# Patient Record
Sex: MALE | Race: WHITE | NOT HISPANIC OR LATINO
[De-identification: names, ages, dates, MRNs, and addresses within clinical notes are randomized per-mention and may not be internally consistent; named-entity substitution may affect disease eponyms.]

---

## 2017-03-16 ENCOUNTER — TRANSCRIPTION ENCOUNTER (OUTPATIENT)
Age: 27
End: 2017-03-16

## 2017-03-17 ENCOUNTER — EMERGENCY (EMERGENCY)
Facility: HOSPITAL | Age: 27
LOS: 1 days | Discharge: HOME | End: 2017-03-17
Attending: EMERGENCY MEDICINE | Admitting: EMERGENCY MEDICINE
Payer: COMMERCIAL

## 2017-03-17 VITALS
HEIGHT: 71 IN | TEMPERATURE: 97 F | DIASTOLIC BLOOD PRESSURE: 62 MMHG | SYSTOLIC BLOOD PRESSURE: 127 MMHG | HEART RATE: 68 BPM | OXYGEN SATURATION: 100 % | RESPIRATION RATE: 18 BRPM | WEIGHT: 164.91 LBS

## 2017-03-17 VITALS
HEART RATE: 62 BPM | SYSTOLIC BLOOD PRESSURE: 118 MMHG | OXYGEN SATURATION: 100 % | DIASTOLIC BLOOD PRESSURE: 74 MMHG | RESPIRATION RATE: 16 BRPM | TEMPERATURE: 98 F

## 2017-03-17 DIAGNOSIS — B95.62 METHICILLIN RESISTANT STAPHYLOCOCCUS AUREUS INFECTION AS THE CAUSE OF DISEASES CLASSIFIED ELSEWHERE: ICD-10-CM

## 2017-03-17 DIAGNOSIS — Z88.2 ALLERGY STATUS TO SULFONAMIDES: ICD-10-CM

## 2017-03-17 DIAGNOSIS — T36.8X5A ADVERSE EFFECT OF OTHER SYSTEMIC ANTIBIOTICS, INITIAL ENCOUNTER: ICD-10-CM

## 2017-03-17 DIAGNOSIS — Z88.1 ALLERGY STATUS TO OTHER ANTIBIOTIC AGENTS STATUS: ICD-10-CM

## 2017-03-17 DIAGNOSIS — Z88.0 ALLERGY STATUS TO PENICILLIN: ICD-10-CM

## 2017-03-17 DIAGNOSIS — R21 RASH AND OTHER NONSPECIFIC SKIN ERUPTION: ICD-10-CM

## 2017-03-17 PROCEDURE — 99284 EMERGENCY DEPT VISIT MOD MDM: CPT | Mod: 25

## 2017-03-17 PROCEDURE — 85027 COMPLETE CBC AUTOMATED: CPT

## 2017-03-17 PROCEDURE — 80048 BASIC METABOLIC PNL TOTAL CA: CPT

## 2017-03-17 PROCEDURE — 96375 TX/PRO/DX INJ NEW DRUG ADDON: CPT

## 2017-03-17 PROCEDURE — 96374 THER/PROPH/DIAG INJ IV PUSH: CPT

## 2017-03-17 PROCEDURE — 99284 EMERGENCY DEPT VISIT MOD MDM: CPT

## 2017-03-17 PROCEDURE — 80076 HEPATIC FUNCTION PANEL: CPT

## 2017-03-17 RX ORDER — FAMOTIDINE 10 MG/ML
20 INJECTION INTRAVENOUS ONCE
Qty: 0 | Refills: 0 | Status: COMPLETED | OUTPATIENT
Start: 2017-03-17 | End: 2017-03-17

## 2017-03-17 RX ORDER — DIPHENHYDRAMINE HCL 50 MG
50 CAPSULE ORAL ONCE
Qty: 0 | Refills: 0 | Status: COMPLETED | OUTPATIENT
Start: 2017-03-17 | End: 2017-03-17

## 2017-03-17 RX ORDER — CIPROFLOXACIN LACTATE 400MG/40ML
0 VIAL (ML) INTRAVENOUS
Qty: 0 | Refills: 0 | COMMUNITY

## 2017-03-17 RX ADMIN — Medication 50 MILLIGRAM(S): at 19:32

## 2017-03-17 RX ADMIN — Medication 100 MILLIGRAM(S): at 19:41

## 2017-03-17 RX ADMIN — FAMOTIDINE 20 MILLIGRAM(S): 10 INJECTION INTRAVENOUS at 19:33

## 2017-03-17 RX ADMIN — Medication 125 MILLIGRAM(S): at 19:41

## 2017-03-17 NOTE — ED PROVIDER NOTE - NS ED MD SCRIBE ATTENDING SCRIBE SECTIONS
REVIEW OF SYSTEMS/HISTORY OF PRESENT ILLNESS/DISPOSITION/HIV/PAST MEDICAL/SURGICAL/SOCIAL HISTORY/PHYSICAL EXAM/VITAL SIGNS( Pullset)

## 2017-03-17 NOTE — ED PROVIDER NOTE - MEDICAL DECISION MAKING DETAILS
Patient with diffuse urticarial rash secondary to bactrim after getting turf burn on right knee. Was seen at [facility] last week, where culture showed MRSA. Was seen yesterday back at [facility], and was switched to Cipro, but Cipro does not cover MRSA. Patient has only been taking benadryl sporadically. He has not taken steroids. Will consult with ID and give IV benadryl and Pepcid.

## 2017-03-17 NOTE — ED ADULT NURSE NOTE - OBJECTIVE STATEMENT
Patient  is  alert  and  oriented  x3.  Color  is  good  and  skin warm to touch.  Hives  noted to  his   back,  ears  and  arms.  He  is  c/o itchiness.  Patient  has  a  wound  to  his  rt lower  leg  which  is  positive   for  MRSA.

## 2017-03-17 NOTE — ED PROVIDER NOTE - CARE PLAN
Principal Discharge DX:	Allergic reaction to drug, initial encounter  Secondary Diagnosis:	MRSA infection

## 2017-03-17 NOTE — ED ADULT NURSE REASSESSMENT NOTE - NS ED NURSE REASSESS COMMENT FT1
patient feels better, rashes subsided, due antibiotics given, VS wnl. Discharged home in stable condition, advised to  meds to his pharmacy and continue taking his antibiotics and ff-up with his PMD in 2-3 days.

## 2017-03-17 NOTE — ED ADULT NURSE NOTE - DISCHARGE TEACHING
ff-up with PMD in 2-3 days and continue taking antibiotics as directed. Return to ED for worsening symptoms.

## 2017-03-17 NOTE — ED PROVIDER NOTE - DETAILS:
Attending MD Silva: The documentation recorded by the scribe accurately reflects  the service I personally performed and the decisions made by me.

## 2017-03-17 NOTE — ED PROVIDER NOTE - OBJECTIVE STATEMENT
26 year old male patient presents to the ED with a diffuse pruritic and urticarial rash on his chest, back, extremities, and face after taking bactrim. Last dose of bactrim was yesterday morning. Last week, patient got turf burn on his right knee. He went to [facility], had it cultured, and was placed on bactrim after the culture showed MRSA. He started getting hives yesterday and had his medications changed to cipro at [facility], but cipro does not cover mrsa. Patient was told to take benadryl, but he took it sporadically and his rash continued to worsen. The last dose of benadryl was this morning. Has not taken steroids. Patient is also allergic to penicillin and clindamycin, which cause him to get a rash.  PMD: Dr. Kaleb Venegas

## 2017-03-17 NOTE — ED PROVIDER NOTE - PROGRESS NOTE DETAILS
Attending Reda: Consulted infectious disease, who recommended switch to doxycycline. Patient told to follow up with ID clinic. Monitored for allergic reaction. Rash is improving.

## 2017-03-29 ENCOUNTER — APPOINTMENT (OUTPATIENT)
Dept: INFECTIOUS DISEASE | Facility: CLINIC | Age: 27
End: 2017-03-29

## 2017-03-29 VITALS
TEMPERATURE: 97.5 F | WEIGHT: 163 LBS | HEART RATE: 67 BPM | OXYGEN SATURATION: 99 % | HEIGHT: 71 IN | SYSTOLIC BLOOD PRESSURE: 123 MMHG | DIASTOLIC BLOOD PRESSURE: 79 MMHG | BODY MASS INDEX: 22.82 KG/M2

## 2017-03-29 DIAGNOSIS — Z78.9 OTHER SPECIFIED HEALTH STATUS: ICD-10-CM

## 2017-06-26 ENCOUNTER — APPOINTMENT (OUTPATIENT)
Dept: INTERNAL MEDICINE | Facility: CLINIC | Age: 27
End: 2017-06-26

## 2017-09-28 ENCOUNTER — APPOINTMENT (OUTPATIENT)
Dept: INTERNAL MEDICINE | Facility: CLINIC | Age: 27
End: 2017-09-28
Payer: COMMERCIAL

## 2017-09-28 ENCOUNTER — LABORATORY RESULT (OUTPATIENT)
Age: 27
End: 2017-09-28

## 2017-09-28 VITALS
SYSTOLIC BLOOD PRESSURE: 120 MMHG | BODY MASS INDEX: 23.38 KG/M2 | DIASTOLIC BLOOD PRESSURE: 70 MMHG | HEART RATE: 63 BPM | HEIGHT: 71 IN | TEMPERATURE: 98.6 F | WEIGHT: 167 LBS | OXYGEN SATURATION: 97 %

## 2017-09-28 PROCEDURE — 99213 OFFICE O/P EST LOW 20 MIN: CPT

## 2017-12-12 ENCOUNTER — RX RENEWAL (OUTPATIENT)
Age: 27
End: 2017-12-12

## 2017-12-12 ENCOUNTER — APPOINTMENT (OUTPATIENT)
Dept: INTERNAL MEDICINE | Facility: CLINIC | Age: 27
End: 2017-12-12
Payer: COMMERCIAL

## 2017-12-12 VITALS — DIASTOLIC BLOOD PRESSURE: 68 MMHG | SYSTOLIC BLOOD PRESSURE: 118 MMHG

## 2017-12-12 VITALS
HEIGHT: 71 IN | WEIGHT: 162 LBS | BODY MASS INDEX: 22.68 KG/M2 | HEART RATE: 59 BPM | SYSTOLIC BLOOD PRESSURE: 110 MMHG | OXYGEN SATURATION: 98 % | DIASTOLIC BLOOD PRESSURE: 80 MMHG | TEMPERATURE: 98.2 F

## 2017-12-12 DIAGNOSIS — L03.115 CELLULITIS OF RIGHT LOWER LIMB: ICD-10-CM

## 2017-12-12 DIAGNOSIS — E80.4 GILBERT SYNDROME: ICD-10-CM

## 2017-12-12 DIAGNOSIS — Z87.39 PERSONAL HISTORY OF OTHER DISEASES OF THE MUSCULOSKELETAL SYSTEM AND CONNECTIVE TISSUE: ICD-10-CM

## 2017-12-12 PROCEDURE — 99395 PREV VISIT EST AGE 18-39: CPT | Mod: 25

## 2017-12-12 PROCEDURE — 36415 COLL VENOUS BLD VENIPUNCTURE: CPT

## 2017-12-12 RX ORDER — MUPIROCIN 20 MG/G
2 OINTMENT TOPICAL TWICE DAILY
Qty: 1 | Refills: 0 | Status: DISCONTINUED | COMMUNITY
Start: 2017-09-28 | End: 2017-12-12

## 2017-12-12 RX ORDER — FEXOFENADINE HYDROCHLORIDE 180 MG/1
180 TABLET, FILM COATED ORAL
Refills: 0 | Status: ACTIVE | COMMUNITY

## 2017-12-12 RX ORDER — LINEZOLID 600 MG/1
600 TABLET, FILM COATED ORAL
Qty: 20 | Refills: 0 | Status: DISCONTINUED | COMMUNITY
Start: 2017-09-28 | End: 2017-12-12

## 2018-01-09 ENCOUNTER — RX RENEWAL (OUTPATIENT)
Age: 28
End: 2018-01-09

## 2018-02-05 ENCOUNTER — EMERGENCY (EMERGENCY)
Facility: HOSPITAL | Age: 28
LOS: 1 days | Discharge: HOME | End: 2018-02-05
Attending: EMERGENCY MEDICINE | Admitting: EMERGENCY MEDICINE
Payer: COMMERCIAL

## 2018-02-05 VITALS
HEART RATE: 66 BPM | SYSTOLIC BLOOD PRESSURE: 124 MMHG | RESPIRATION RATE: 17 BRPM | OXYGEN SATURATION: 100 % | TEMPERATURE: 98 F | DIASTOLIC BLOOD PRESSURE: 83 MMHG

## 2018-02-05 VITALS
TEMPERATURE: 99 F | DIASTOLIC BLOOD PRESSURE: 69 MMHG | OXYGEN SATURATION: 98 % | WEIGHT: 164.91 LBS | RESPIRATION RATE: 18 BRPM | SYSTOLIC BLOOD PRESSURE: 116 MMHG | HEART RATE: 70 BPM

## 2018-02-05 PROCEDURE — 93010 ELECTROCARDIOGRAM REPORT: CPT

## 2018-02-05 PROCEDURE — 99285 EMERGENCY DEPT VISIT HI MDM: CPT | Mod: 25

## 2018-02-05 RX ORDER — SODIUM CHLORIDE 9 MG/ML
3 INJECTION INTRAMUSCULAR; INTRAVENOUS; SUBCUTANEOUS ONCE
Qty: 0 | Refills: 0 | Status: DISCONTINUED | OUTPATIENT
Start: 2018-02-05 | End: 2018-02-09

## 2018-02-05 RX ORDER — FAMOTIDINE 10 MG/ML
20 INJECTION INTRAVENOUS ONCE
Qty: 0 | Refills: 0 | Status: DISCONTINUED | OUTPATIENT
Start: 2018-02-05 | End: 2018-02-06

## 2018-02-05 RX ADMIN — Medication 30 MILLILITER(S): at 23:42

## 2018-02-05 NOTE — ED ADULT NURSE NOTE - OBJECTIVE STATEMENT
Patient presented to ED ambulatory w/ mother c/o mid sternal  chest pain x 3 days. Patient exercises regularly. Patient took aspirin at home. Patient presented to ED ambulatory w/ mother c/o mid sternal  chest pain x 3 days. Patient exercises regularly. Patient took aspirin at home. No further complaints

## 2018-02-05 NOTE — ED PROVIDER NOTE - MEDICAL DECISION MAKING DETAILS
Dr. Coy: Male patient with no past medical hx of systemic illnesses, brought to ED due to complaints of chest pain. Patient found in no acute distress, calm, speaking in complete sentences. ECG with no acute ischemic changes. Patient describes intermittent, sharp, pulling chest pain with no associated diaphoresis, nausea/vomiting. No family hx of CAD. Labs and CXR ordered as part of cardiac work-up. Patient describes associated occasional belching, for which pepcid and maalox were ordered. Will f/u on results and patient's clinical progression to determine final disposition.

## 2018-02-05 NOTE — ED PROVIDER NOTE - PROGRESS NOTE DETAILS
Patient was evaluated by me, found in no acute distress, calm, speaking in complete sentences. Patient describes intermittent, sharp, pulling chest pain with no associated diaphoresis, nausea/vomiting. No family hx of CAD. Labs and CXR ordered as part of cardiac work-up. Patient describes associated occasional belching, for which pepcid and maalox were ordered. Will f/u on results and patient's clinical progression to determine final disposition. I agree with resident assessment and plan. Dr. Skip Coy Called radiology - discussed the CXR findings. no acute/emergent findings. will send home with return precautions Results discussed, will dc with f/u PMD Patient was evaluated by me, found in no acute distress, calm, speaking in complete sentences. Patient describes intermittent, sharp, pulling chest pain with no associated diaphoresis, nausea/vomiting. No family hx of CAD. Labs and CXR ordered as part of cardiac work-up. Patient describes associated occasional belching, for which pepcid and maalox were ordered. Will f/u on results and patient's clinical progression to determine final disposition. I agree with resident assessment and plan.- Dr. Skip Coy

## 2018-02-05 NOTE — ED PROVIDER NOTE - PHYSICAL EXAMINATION
Gen: Well appearing, NAD  Head: NCAT  HEENT: PERRL, MMM, normal conjunctiva, anicteric, neck supple  Lung: CTAB, no adventitious sounds  CV: RRR, no murmurs, rubs or gallops  Abd: soft, NTND, no rebound or guarding, no CVAT  MSK: No edema, no visible deformities  Neuro: No focal neurologic deficits. CN II-XII grossly intact. 5/5 strength and normal sensation in all extremities.  Skin: Warm and dry, no evidence of rash  Psych: anxious mood and affect

## 2018-02-05 NOTE — ED PROVIDER NOTE - CARE PLAN
Principal Discharge DX:	Chest discomfort Principal Discharge DX:	Chest discomfort  Assessment and plan of treatment:	Take ibuprofen 600 mg every 8 hours as needed for pain with food and plenty of water  Rest, ice or heat packs as needed for discomfort.  Follow up with your primary doctor within the next 1-2 days  If you have any worsening or changing symptoms such as confusion, loss of consciousness, worsening pain, nausea/vomiting, or if you have any other concerns please return to the emergency department

## 2018-02-05 NOTE — ED PROVIDER NOTE - NS ED ROS FT
ROS: denies HA, weakness, dizziness, fevers/chills, nausea/vomiting, SOB, diaphoresis, abdominal pain, diarrhea, joint pain, neuro deficits, dysuria/hematuria, rash    +Chest discomfort

## 2018-02-05 NOTE — ED PROVIDER NOTE - OBJECTIVE STATEMENT
28yo M with hx of GERD presents with midsternal chest discomfort for 3-4 days. feels similar to GERD but radiates to the L chest and down the L arm. No FH of heart disease, no hx of smoking. pain worse with exertion, some palpitations when working out. no associated sob/diaphoresis. otherwise healthy. no fevers, chills, no recent illnesses.

## 2018-02-06 LAB
ALBUMIN SERPL ELPH-MCNC: 5 G/DL — SIGNIFICANT CHANGE UP (ref 3.3–5)
ALBUMIN SERPL ELPH-MCNC: 5.2 G/DL
ALP BLD-CCNC: 66 U/L
ALP SERPL-CCNC: 62 U/L — SIGNIFICANT CHANGE UP (ref 40–120)
ALT FLD-CCNC: 17 U/L RC — SIGNIFICANT CHANGE UP (ref 10–45)
ALT SERPL-CCNC: 22 U/L
ANION GAP SERPL CALC-SCNC: 13 MMOL/L — SIGNIFICANT CHANGE UP (ref 5–17)
ANION GAP SERPL CALC-SCNC: 15 MMOL/L
AST SERPL-CCNC: 21 U/L — SIGNIFICANT CHANGE UP (ref 10–40)
AST SERPL-CCNC: 23 U/L
BASOPHILS # BLD AUTO: 0.05 K/UL
BASOPHILS # BLD AUTO: 0.1 K/UL — SIGNIFICANT CHANGE UP (ref 0–0.2)
BASOPHILS NFR BLD AUTO: 1.1 %
BASOPHILS NFR BLD AUTO: 1.1 % — SIGNIFICANT CHANGE UP (ref 0–2)
BILIRUB SERPL-MCNC: 1.5 MG/DL — HIGH (ref 0.2–1.2)
BILIRUB SERPL-MCNC: 2.9 MG/DL
BUN SERPL-MCNC: 16 MG/DL — SIGNIFICANT CHANGE UP (ref 7–23)
BUN SERPL-MCNC: 20 MG/DL
C TRACH RRNA SPEC QL NAA+PROBE: NOT DETECTED
CALCIUM SERPL-MCNC: 10.2 MG/DL
CALCIUM SERPL-MCNC: 9.5 MG/DL — SIGNIFICANT CHANGE UP (ref 8.4–10.5)
CHLORIDE SERPL-SCNC: 100 MMOL/L
CHLORIDE SERPL-SCNC: 100 MMOL/L — SIGNIFICANT CHANGE UP (ref 96–108)
CHOLEST SERPL-MCNC: 165 MG/DL
CHOLEST/HDLC SERPL: 2.7 RATIO
CO2 SERPL-SCNC: 24 MMOL/L
CO2 SERPL-SCNC: 25 MMOL/L — SIGNIFICANT CHANGE UP (ref 22–31)
CREAT SERPL-MCNC: 1.11 MG/DL — SIGNIFICANT CHANGE UP (ref 0.5–1.3)
CREAT SERPL-MCNC: 1.29 MG/DL
EOSINOPHIL # BLD AUTO: 0.1 K/UL — SIGNIFICANT CHANGE UP (ref 0–0.5)
EOSINOPHIL # BLD AUTO: 0.24 K/UL
EOSINOPHIL NFR BLD AUTO: 1.5 % — SIGNIFICANT CHANGE UP (ref 0–6)
EOSINOPHIL NFR BLD AUTO: 5.3
GLUCOSE SERPL-MCNC: 88 MG/DL
GLUCOSE SERPL-MCNC: 89 MG/DL — SIGNIFICANT CHANGE UP (ref 70–99)
HBV SURFACE AB SER QL: NONREACTIVE
HBV SURFACE AG SER QL: NONREACTIVE
HCT VFR BLD CALC: 46.6 % — SIGNIFICANT CHANGE UP (ref 39–50)
HCT VFR BLD CALC: 47.8 %
HCV AB SER QL: NONREACTIVE
HCV S/CO RATIO: 0.19 S/CO
HDLC SERPL-MCNC: 62 MG/DL
HGB BLD-MCNC: 16.4 G/DL
HGB BLD-MCNC: 16.7 G/DL — SIGNIFICANT CHANGE UP (ref 13–17)
HIV1+2 AB SPEC QL IA.RAPID: NONREACTIVE
HSV 1+2 IGG SER IA-IMP: NEGATIVE
HSV 1+2 IGG SER IA-IMP: NEGATIVE
HSV1 IGG SER QL: 0.16 INDEX
HSV2 IGG SER QL: 0.09 INDEX
IMM GRANULOCYTES NFR BLD AUTO: 0 %
LDLC SERPL CALC-MCNC: 86 MG/DL
LYMPHOCYTES # BLD AUTO: 1.45 K/UL
LYMPHOCYTES # BLD AUTO: 1.9 K/UL — SIGNIFICANT CHANGE UP (ref 1–3.3)
LYMPHOCYTES # BLD AUTO: 24.9 % — SIGNIFICANT CHANGE UP (ref 13–44)
LYMPHOCYTES NFR BLD AUTO: 31.9 %
MAN DIFF?: NORMAL
MCHC RBC-ENTMCNC: 33.4 PG
MCHC RBC-ENTMCNC: 34.3 GM/DL
MCHC RBC-ENTMCNC: 34.6 PG — HIGH (ref 27–34)
MCHC RBC-ENTMCNC: 35.9 GM/DL — SIGNIFICANT CHANGE UP (ref 32–36)
MCV RBC AUTO: 96.2 FL — SIGNIFICANT CHANGE UP (ref 80–100)
MCV RBC AUTO: 97.4 FL
MONOCYTES # BLD AUTO: 0.4 K/UL — SIGNIFICANT CHANGE UP (ref 0–0.9)
MONOCYTES # BLD AUTO: 0.43 K/UL
MONOCYTES NFR BLD AUTO: 5.5 % — SIGNIFICANT CHANGE UP (ref 2–14)
MONOCYTES NFR BLD AUTO: 9.5 %
N GONORRHOEA RRNA SPEC QL NAA+PROBE: NOT DETECTED
NEUTROPHILS # BLD AUTO: 2.37 K/UL
NEUTROPHILS # BLD AUTO: 5.2 K/UL — SIGNIFICANT CHANGE UP (ref 1.8–7.4)
NEUTROPHILS NFR BLD AUTO: 52.2 %
NEUTROPHILS NFR BLD AUTO: 67 % — SIGNIFICANT CHANGE UP (ref 43–77)
PLATELET # BLD AUTO: 187 K/UL
PLATELET # BLD AUTO: 190 K/UL — SIGNIFICANT CHANGE UP (ref 150–400)
POTASSIUM SERPL-MCNC: 3.6 MMOL/L — SIGNIFICANT CHANGE UP (ref 3.5–5.3)
POTASSIUM SERPL-SCNC: 3.6 MMOL/L — SIGNIFICANT CHANGE UP (ref 3.5–5.3)
POTASSIUM SERPL-SCNC: 4.2 MMOL/L
PROT SERPL-MCNC: 7.4 G/DL — SIGNIFICANT CHANGE UP (ref 6–8.3)
PROT SERPL-MCNC: 7.9 G/DL
RBC # BLD: 4.84 M/UL — SIGNIFICANT CHANGE UP (ref 4.2–5.8)
RBC # BLD: 4.91 M/UL
RBC # FLD: 10.6 % — SIGNIFICANT CHANGE UP (ref 10.3–14.5)
RBC # FLD: 12 %
SODIUM SERPL-SCNC: 138 MMOL/L — SIGNIFICANT CHANGE UP (ref 135–145)
SODIUM SERPL-SCNC: 139 MMOL/L
SOURCE AMPLIFICATION: NORMAL
T PALLIDUM AB SER QL IA: NEGATIVE
T4 FREE SERPL-MCNC: 1.6 NG/DL
TRIGL SERPL-MCNC: 85 MG/DL
TROPONIN T SERPL-MCNC: <0.01 NG/ML — SIGNIFICANT CHANGE UP (ref 0–0.06)
TSH SERPL-ACNC: 1.45 UIU/ML
WBC # BLD: 7.7 K/UL — SIGNIFICANT CHANGE UP (ref 3.8–10.5)
WBC # FLD AUTO: 4.54 K/UL
WBC # FLD AUTO: 7.7 K/UL — SIGNIFICANT CHANGE UP (ref 3.8–10.5)

## 2018-02-06 PROCEDURE — 99283 EMERGENCY DEPT VISIT LOW MDM: CPT | Mod: 25

## 2018-02-06 PROCEDURE — 93005 ELECTROCARDIOGRAM TRACING: CPT

## 2018-02-06 PROCEDURE — 84484 ASSAY OF TROPONIN QUANT: CPT

## 2018-02-06 PROCEDURE — 85027 COMPLETE CBC AUTOMATED: CPT

## 2018-02-06 PROCEDURE — 80053 COMPREHEN METABOLIC PANEL: CPT

## 2018-02-06 PROCEDURE — 71046 X-RAY EXAM CHEST 2 VIEWS: CPT

## 2018-02-06 PROCEDURE — 71046 X-RAY EXAM CHEST 2 VIEWS: CPT | Mod: 26

## 2018-02-06 RX ORDER — FAMOTIDINE 10 MG/ML
20 INJECTION INTRAVENOUS DAILY
Qty: 0 | Refills: 0 | Status: DISCONTINUED | OUTPATIENT
Start: 2018-02-06 | End: 2018-02-09

## 2018-02-06 RX ADMIN — FAMOTIDINE 20 MILLIGRAM(S): 10 INJECTION INTRAVENOUS at 00:39

## 2018-02-08 ENCOUNTER — NON-APPOINTMENT (OUTPATIENT)
Age: 28
End: 2018-02-08

## 2018-02-08 ENCOUNTER — APPOINTMENT (OUTPATIENT)
Dept: CARDIOLOGY | Facility: CLINIC | Age: 28
End: 2018-02-08
Payer: COMMERCIAL

## 2018-02-08 VITALS
DIASTOLIC BLOOD PRESSURE: 73 MMHG | OXYGEN SATURATION: 99 % | SYSTOLIC BLOOD PRESSURE: 118 MMHG | RESPIRATION RATE: 12 BRPM | WEIGHT: 160 LBS | HEIGHT: 71 IN | BODY MASS INDEX: 22.4 KG/M2 | TEMPERATURE: 97.3 F | HEART RATE: 53 BPM

## 2018-02-08 DIAGNOSIS — R07.89 OTHER CHEST PAIN: ICD-10-CM

## 2018-02-08 PROCEDURE — 93000 ELECTROCARDIOGRAM COMPLETE: CPT

## 2018-02-08 PROCEDURE — 99244 OFF/OP CNSLTJ NEW/EST MOD 40: CPT | Mod: 25

## 2018-02-10 ENCOUNTER — NON-APPOINTMENT (OUTPATIENT)
Age: 28
End: 2018-02-10

## 2018-02-12 ENCOUNTER — APPOINTMENT (OUTPATIENT)
Dept: CARDIOLOGY | Facility: CLINIC | Age: 28
End: 2018-02-12
Payer: COMMERCIAL

## 2018-02-12 PROCEDURE — 93306 TTE W/DOPPLER COMPLETE: CPT

## 2018-04-12 ENCOUNTER — APPOINTMENT (OUTPATIENT)
Dept: CARDIOLOGY | Facility: CLINIC | Age: 28
End: 2018-04-12

## 2020-01-27 ENCOUNTER — APPOINTMENT (OUTPATIENT)
Dept: INTERNAL MEDICINE | Facility: CLINIC | Age: 30
End: 2020-01-27
Payer: COMMERCIAL

## 2020-01-27 VITALS
BODY MASS INDEX: 22.68 KG/M2 | HEIGHT: 71 IN | WEIGHT: 162 LBS | OXYGEN SATURATION: 97 % | HEART RATE: 57 BPM | TEMPERATURE: 97.8 F

## 2020-01-27 PROCEDURE — 36415 COLL VENOUS BLD VENIPUNCTURE: CPT

## 2020-01-27 PROCEDURE — G0444 DEPRESSION SCREEN ANNUAL: CPT

## 2020-01-27 PROCEDURE — 99395 PREV VISIT EST AGE 18-39: CPT | Mod: 25

## 2020-01-27 RX ORDER — CHROMIUM 200 MCG
TABLET ORAL
Refills: 0 | Status: ACTIVE | COMMUNITY

## 2020-01-27 RX ORDER — CHLORHEXIDINE GLUCONATE 4 %
LIQUID (ML) TOPICAL
Refills: 0 | Status: ACTIVE | COMMUNITY

## 2020-01-28 VITALS — DIASTOLIC BLOOD PRESSURE: 62 MMHG | SYSTOLIC BLOOD PRESSURE: 105 MMHG

## 2020-01-28 NOTE — PHYSICAL EXAM
[No Acute Distress] : no acute distress [Well Nourished] : well nourished [Well Developed] : well developed [Well-Appearing] : well-appearing [Normal Sclera/Conjunctiva] : normal sclera/conjunctiva [PERRL] : pupils equal round and reactive to light [EOMI] : extraocular movements intact [Normal Outer Ear/Nose] : the outer ears and nose were normal in appearance [Normal Oropharynx] : the oropharynx was normal [No JVD] : no jugular venous distention [No Lymphadenopathy] : no lymphadenopathy [Supple] : supple [Thyroid Normal, No Nodules] : the thyroid was normal and there were no nodules present [No Respiratory Distress] : no respiratory distress  [No Accessory Muscle Use] : no accessory muscle use [Clear to Auscultation] : lungs were clear to auscultation bilaterally [Normal Rate] : normal rate  [Regular Rhythm] : with a regular rhythm [Normal S1, S2] : normal S1 and S2 [No Murmur] : no murmur heard [No Carotid Bruits] : no carotid bruits [No Abdominal Bruit] : a ~M bruit was not heard ~T in the abdomen [No Varicosities] : no varicosities [Pedal Pulses Present] : the pedal pulses are present [No Edema] : there was no peripheral edema [No Palpable Aorta] : no palpable aorta [No Extremity Clubbing/Cyanosis] : no extremity clubbing/cyanosis [Soft] : abdomen soft [Non Tender] : non-tender [Non-distended] : non-distended [No Masses] : no abdominal mass palpated [No HSM] : no HSM [Normal Bowel Sounds] : normal bowel sounds [Normal Posterior Cervical Nodes] : no posterior cervical lymphadenopathy [Normal Anterior Cervical Nodes] : no anterior cervical lymphadenopathy [No CVA Tenderness] : no CVA  tenderness [No Spinal Tenderness] : no spinal tenderness [No Joint Swelling] : no joint swelling [Grossly Normal Strength/Tone] : grossly normal strength/tone [No Rash] : no rash [Coordination Grossly Intact] : coordination grossly intact [No Focal Deficits] : no focal deficits [Normal Gait] : normal gait [Deep Tendon Reflexes (DTR)] : deep tendon reflexes were 2+ and symmetric [Normal Affect] : the affect was normal [Normal Insight/Judgement] : insight and judgment were intact [de-identified] : normal male, no masses [de-identified] : left abdominal wall mole with slight surrounding erythema

## 2020-01-28 NOTE — HEALTH RISK ASSESSMENT
[No] : No [No falls in past year] : Patient reported no falls in the past year [0] : 2) Feeling down, depressed, or hopeless: Not at all (0) [Fully functional (bathing, dressing, toileting, transferring, walking, feeding)] : Fully functional (bathing, dressing, toileting, transferring, walking, feeding) [Fully functional (using the telephone, shopping, preparing meals, housekeeping, doing laundry, using] : Fully functional and needs no help or supervision to perform IADLs (using the telephone, shopping, preparing meals, housekeeping, doing laundry, using transportation, managing medications and managing finances) [] : No [OOM6Demos] : 0 [Reports changes in hearing] : Reports no changes in hearing [Reports changes in vision] : Reports no changes in vision [Reports changes in dental health] : Reports no changes in dental health

## 2020-01-28 NOTE — ASSESSMENT
[FreeTextEntry1] : Discussed diet and exercise.  Weight is good.  Check lipids.\par \par The left abdominal wall brown lesion has changed with a slight surrounding redness.  It requires evaluation- he already has an appointment to see a dermatologist tomorrow.  He will have the report sent here.\par \par STD testing not needed.  \par \par Check a Vitamin D- he takes 5000 units QD.\par \par He declines the flu vaccine.  \par \par He requests a HgBA1c- FH DM.  \par \par Send a copy of blood tests to the patient.

## 2020-01-28 NOTE — HISTORY OF PRESENT ILLNESS
[FreeTextEntry1] : The patient is here for a routine visit.  [de-identified] : He exercises 4-5 days per week.  His diet is healthy.\par \par He is engaged and will be  on 10/30.  He lives in [place].\par \par No chest pain, dyspnea, GI or  symptoms.\par \par He notes a left abdominal wall brown spot which he says he has always had but there is now a slight surrounding redness.

## 2020-05-01 LAB
25(OH)D3 SERPL-MCNC: 55 NG/ML
ALBUMIN SERPL ELPH-MCNC: 5.4 G/DL
ALP BLD-CCNC: 69 U/L
ALT SERPL-CCNC: 21 U/L
ANION GAP SERPL CALC-SCNC: 18 MMOL/L
APPEARANCE: CLEAR
AST SERPL-CCNC: 24 U/L
BACTERIA: NEGATIVE
BASOPHILS # BLD AUTO: 0.05 K/UL
BASOPHILS NFR BLD AUTO: 0.9 %
BILIRUB SERPL-MCNC: 1.8 MG/DL
BILIRUBIN URINE: NEGATIVE
BLOOD URINE: NEGATIVE
BUN SERPL-MCNC: 20 MG/DL
CALCIUM SERPL-MCNC: 10.3 MG/DL
CHLORIDE SERPL-SCNC: 100 MMOL/L
CHOLEST SERPL-MCNC: 200 MG/DL
CHOLEST/HDLC SERPL: 3 RATIO
CO2 SERPL-SCNC: 24 MMOL/L
COLOR: COLORLESS
CREAT SERPL-MCNC: 1.05 MG/DL
EOSINOPHIL # BLD AUTO: 0.1 K/UL
EOSINOPHIL NFR BLD AUTO: 1.8 %
ESTIMATED AVERAGE GLUCOSE: 97 MG/DL
GLUCOSE QUALITATIVE U: NEGATIVE
GLUCOSE SERPL-MCNC: 94 MG/DL
HBA1C MFR BLD HPLC: 5 %
HCT VFR BLD CALC: 50.5 %
HDLC SERPL-MCNC: 68 MG/DL
HGB BLD-MCNC: 17.1 G/DL
HYALINE CASTS: 0 /LPF
IMM GRANULOCYTES NFR BLD AUTO: 0.2 %
KETONES URINE: NEGATIVE
LDLC SERPL CALC-MCNC: 121 MG/DL
LEUKOCYTE ESTERASE URINE: NEGATIVE
LYMPHOCYTES # BLD AUTO: 1.54 K/UL
LYMPHOCYTES NFR BLD AUTO: 27.9 %
MAN DIFF?: NORMAL
MCHC RBC-ENTMCNC: 32.3 PG
MCHC RBC-ENTMCNC: 33.9 GM/DL
MCV RBC AUTO: 95.5 FL
MICROSCOPIC-UA: NORMAL
MONOCYTES # BLD AUTO: 0.5 K/UL
MONOCYTES NFR BLD AUTO: 9.1 %
NEUTROPHILS # BLD AUTO: 3.32 K/UL
NEUTROPHILS NFR BLD AUTO: 60.1 %
NITRITE URINE: NEGATIVE
PH URINE: 7
PLATELET # BLD AUTO: 208 K/UL
POTASSIUM SERPL-SCNC: 5.1 MMOL/L
PROT SERPL-MCNC: 7.4 G/DL
PROTEIN URINE: NEGATIVE
RBC # BLD: 5.29 M/UL
RBC # FLD: 11.6 %
RED BLOOD CELLS URINE: 0 /HPF
SODIUM SERPL-SCNC: 142 MMOL/L
SPECIFIC GRAVITY URINE: 1
SQUAMOUS EPITHELIAL CELLS: 0 /HPF
T3FREE SERPL-MCNC: 3.17 PG/ML
T4 FREE SERPL-MCNC: 1.6 NG/DL
TRIGL SERPL-MCNC: 56 MG/DL
TSH SERPL-ACNC: 1.73 UIU/ML
UROBILINOGEN URINE: NORMAL
WBC # FLD AUTO: 5.52 K/UL
WHITE BLOOD CELLS URINE: 0 /HPF

## 2021-11-30 ENCOUNTER — APPOINTMENT (OUTPATIENT)
Dept: INTERNAL MEDICINE | Facility: CLINIC | Age: 31
End: 2021-11-30
Payer: COMMERCIAL

## 2021-11-30 VITALS
HEART RATE: 90 BPM | OXYGEN SATURATION: 99 % | BODY MASS INDEX: 23.43 KG/M2 | WEIGHT: 173 LBS | TEMPERATURE: 97.9 F | HEIGHT: 72 IN

## 2021-11-30 VITALS — SYSTOLIC BLOOD PRESSURE: 110 MMHG | DIASTOLIC BLOOD PRESSURE: 75 MMHG

## 2021-11-30 DIAGNOSIS — R14.0 ABDOMINAL DISTENSION (GASEOUS): ICD-10-CM

## 2021-11-30 DIAGNOSIS — R19.06 EPIGASTRIC SWELLING, MASS OR LUMP: ICD-10-CM

## 2021-11-30 PROCEDURE — 99214 OFFICE O/P EST MOD 30 MIN: CPT | Mod: 25

## 2021-11-30 NOTE — ASSESSMENT
[FreeTextEntry1] : He noticed an upper abdominal ump for three days.  On exam there is a suggestion of a prominence- not clear what it is.  Consider a hernia, lipoma or other pathology.  Will start with an abdominal U/S.  Consider a CT A/P depending on initial results.\par \par He also has bloating for a year and reflux.  It is worse in the last three days- not sure if related to the lump or if it's due to feeling anxious about it.  Will check blood tests and the U/S.  Also consider a CT and could consider a PPI trial.\par \par He had a skin lesion removed from the left abdominal wall in March, 2020 by his report.  He doesn't know the pathology but he says it wasn't a melanoma.  He has a routine follow-up planned.  We'll get the dermatology report.  Done at [facility] in [place] (Dr. Miranda?)\par \par He was advised to have the COVID-19 vaccine and flu vaccine which he declines.

## 2021-11-30 NOTE — PHYSICAL EXAM
[Normal] : normal rate, regular rhythm, normal S1 and S2 and no murmur heard [Soft] : abdomen soft [Non Tender] : non-tender [Non-distended] : non-distended [No HSM] : no HSM [Normal Bowel Sounds] : normal bowel sounds [No Hernias] : no hernias [de-identified] : there is a suggestion of a soft lump in the midline superior to the umbilicus. Not tender and no distinct mass.  Not an obvious hernia.

## 2021-11-30 NOTE — HISTORY OF PRESENT ILLNESS
[de-identified] : The patient noticed a lump in the midline upper abdomen a few cm superior to the umbilicus three days ago.  He noticed it upon waking up.  It's not painful but he feels it.  No trauma or protrusion.  \par \par He also has bloating and reflux for about a year but he has noticed that more in the last three days since he noticed the lump.  No fever, N/V, diarrhea, constipation, BRBPR, black stool, weight loss.  His appetite is good and weight stable.  He hasn't taken any medication for it.  \par \par He had COVID-19 in 8/21.\par \par He is now .

## 2021-12-08 ENCOUNTER — APPOINTMENT (OUTPATIENT)
Dept: ULTRASOUND IMAGING | Facility: CLINIC | Age: 31
End: 2021-12-08
Payer: COMMERCIAL

## 2021-12-08 ENCOUNTER — OUTPATIENT (OUTPATIENT)
Dept: OUTPATIENT SERVICES | Facility: HOSPITAL | Age: 31
LOS: 1 days | End: 2021-12-08
Payer: COMMERCIAL

## 2021-12-08 DIAGNOSIS — R19.06 EPIGASTRIC SWELLING, MASS OR LUMP: ICD-10-CM

## 2021-12-08 DIAGNOSIS — R14.0 ABDOMINAL DISTENSION (GASEOUS): ICD-10-CM

## 2021-12-08 PROCEDURE — 76700 US EXAM ABDOM COMPLETE: CPT | Mod: 26

## 2021-12-08 PROCEDURE — 76700 US EXAM ABDOM COMPLETE: CPT

## 2021-12-16 ENCOUNTER — NON-APPOINTMENT (OUTPATIENT)
Age: 31
End: 2021-12-16

## 2021-12-16 LAB
ALBUMIN SERPL ELPH-MCNC: 5.1 G/DL
ALP BLD-CCNC: 78 U/L
ALT SERPL-CCNC: 43 U/L
AMYLASE/CREAT SERPL: 74 U/L
ANION GAP SERPL CALC-SCNC: 14 MMOL/L
AST SERPL-CCNC: 28 U/L
BASOPHILS # BLD AUTO: 0.05 K/UL
BASOPHILS NFR BLD AUTO: 0.6 %
BILIRUB SERPL-MCNC: 1.8 MG/DL
BUN SERPL-MCNC: 21 MG/DL
CALCIUM SERPL-MCNC: 9.7 MG/DL
CHLORIDE SERPL-SCNC: 101 MMOL/L
CO2 SERPL-SCNC: 25 MMOL/L
CREAT SERPL-MCNC: 1.2 MG/DL
CRP SERPL-MCNC: <3 MG/L
EOSINOPHIL # BLD AUTO: 0.13 K/UL
EOSINOPHIL NFR BLD AUTO: 1.7 %
ERYTHROCYTE [SEDIMENTATION RATE] IN BLOOD BY WESTERGREN METHOD: 5 MM/HR
GLUCOSE SERPL-MCNC: 98 MG/DL
HCT VFR BLD CALC: 50.6 %
HGB BLD-MCNC: 17 G/DL
IMM GRANULOCYTES NFR BLD AUTO: 0.1 %
LPL SERPL-CCNC: 32 U/L
LYMPHOCYTES # BLD AUTO: 1.82 K/UL
LYMPHOCYTES NFR BLD AUTO: 23.3 %
MAN DIFF?: NORMAL
MCHC RBC-ENTMCNC: 32.3 PG
MCHC RBC-ENTMCNC: 33.6 GM/DL
MCV RBC AUTO: 96.2 FL
MONOCYTES # BLD AUTO: 0.67 K/UL
MONOCYTES NFR BLD AUTO: 8.6 %
NEUTROPHILS # BLD AUTO: 5.14 K/UL
NEUTROPHILS NFR BLD AUTO: 65.7 %
PLATELET # BLD AUTO: 215 K/UL
POTASSIUM SERPL-SCNC: 4.2 MMOL/L
PROT SERPL-MCNC: 7.2 G/DL
RBC # BLD: 5.26 M/UL
RBC # FLD: 11.9 %
SODIUM SERPL-SCNC: 140 MMOL/L
WBC # FLD AUTO: 7.82 K/UL

## 2022-01-21 ENCOUNTER — APPOINTMENT (OUTPATIENT)
Dept: INTERNAL MEDICINE | Facility: CLINIC | Age: 32
End: 2022-01-21

## 2022-01-26 ENCOUNTER — NON-APPOINTMENT (OUTPATIENT)
Age: 32
End: 2022-01-26

## 2022-09-01 ENCOUNTER — APPOINTMENT (OUTPATIENT)
Dept: INTERNAL MEDICINE | Facility: CLINIC | Age: 32
End: 2022-09-01

## 2022-09-01 VITALS
BODY MASS INDEX: 23.43 KG/M2 | HEART RATE: 78 BPM | HEIGHT: 72 IN | TEMPERATURE: 97.3 F | OXYGEN SATURATION: 98 % | WEIGHT: 173 LBS

## 2022-09-01 VITALS — DIASTOLIC BLOOD PRESSURE: 65 MMHG | SYSTOLIC BLOOD PRESSURE: 105 MMHG

## 2022-09-01 DIAGNOSIS — Z00.00 ENCOUNTER FOR GENERAL ADULT MEDICAL EXAMINATION W/OUT ABNORMAL FINDINGS: ICD-10-CM

## 2022-09-01 PROCEDURE — G0444 DEPRESSION SCREEN ANNUAL: CPT | Mod: 59

## 2022-09-01 PROCEDURE — 99395 PREV VISIT EST AGE 18-39: CPT | Mod: 25

## 2022-09-01 PROCEDURE — 36415 COLL VENOUS BLD VENIPUNCTURE: CPT

## 2022-09-01 NOTE — HISTORY OF PRESENT ILLNESS
[FreeTextEntry1] : The patient is here for a routine visit.  [de-identified] : He feels well.  His diet is healthy.  He exercises regularly.  \par \par No GI or  symptoms.\par \par He is  for about a year.  He works and goes into the office [employer].

## 2022-09-01 NOTE — ASSESSMENT
[FreeTextEntry1] : Good health.  Discussed diet and exercise.  Check blood tests.\par \par He had the COVID-19 vaccine, second dose in 5/22.\par \par He was advised to see a dermatologist yearly in routine follow-up.

## 2022-09-01 NOTE — PHYSICAL EXAM
[No Acute Distress] : no acute distress [Well Nourished] : well nourished [Well Developed] : well developed [Well-Appearing] : well-appearing [Normal Sclera/Conjunctiva] : normal sclera/conjunctiva [PERRL] : pupils equal round and reactive to light [EOMI] : extraocular movements intact [Normal Outer Ear/Nose] : the outer ears and nose were normal in appearance [Normal Oropharynx] : the oropharynx was normal [No JVD] : no jugular venous distention [No Lymphadenopathy] : no lymphadenopathy [Supple] : supple [Thyroid Normal, No Nodules] : the thyroid was normal and there were no nodules present [No Respiratory Distress] : no respiratory distress  [No Accessory Muscle Use] : no accessory muscle use [Clear to Auscultation] : lungs were clear to auscultation bilaterally [Normal Rate] : normal rate  [Regular Rhythm] : with a regular rhythm [Normal S1, S2] : normal S1 and S2 [No Murmur] : no murmur heard [No Carotid Bruits] : no carotid bruits [No Abdominal Bruit] : a ~M bruit was not heard ~T in the abdomen [No Varicosities] : no varicosities [Pedal Pulses Present] : the pedal pulses are present [No Edema] : there was no peripheral edema [No Palpable Aorta] : no palpable aorta [No Extremity Clubbing/Cyanosis] : no extremity clubbing/cyanosis [Soft] : abdomen soft [Non Tender] : non-tender [Non-distended] : non-distended [No Masses] : no abdominal mass palpated [No HSM] : no HSM [Normal Bowel Sounds] : normal bowel sounds [Normal Posterior Cervical Nodes] : no posterior cervical lymphadenopathy [Normal Anterior Cervical Nodes] : no anterior cervical lymphadenopathy [No CVA Tenderness] : no CVA  tenderness [No Spinal Tenderness] : no spinal tenderness [No Joint Swelling] : no joint swelling [Grossly Normal Strength/Tone] : grossly normal strength/tone [No Rash] : no rash [Coordination Grossly Intact] : coordination grossly intact [No Focal Deficits] : no focal deficits [Normal Gait] : normal gait [Deep Tendon Reflexes (DTR)] : deep tendon reflexes were 2+ and symmetric [Normal Affect] : the affect was normal [Normal Insight/Judgement] : insight and judgment were intact [de-identified] : normal male, no masses

## 2023-06-08 LAB
ALBUMIN SERPL ELPH-MCNC: 5.4 G/DL
ALP BLD-CCNC: 90 U/L
ALT SERPL-CCNC: 19 U/L
ANION GAP SERPL CALC-SCNC: 12 MMOL/L
APPEARANCE: CLEAR
AST SERPL-CCNC: 27 U/L
BACTERIA: NEGATIVE
BASOPHILS # BLD AUTO: 0.05 K/UL
BASOPHILS NFR BLD AUTO: 0.9 %
BILIRUB SERPL-MCNC: 1.8 MG/DL
BILIRUBIN URINE: NEGATIVE
BLOOD URINE: NEGATIVE
BUN SERPL-MCNC: 15 MG/DL
CALCIUM SERPL-MCNC: 10.1 MG/DL
CHLORIDE SERPL-SCNC: 100 MMOL/L
CHOLEST SERPL-MCNC: 215 MG/DL
CO2 SERPL-SCNC: 27 MMOL/L
COLOR: COLORLESS
CREAT SERPL-MCNC: 1.14 MG/DL
EGFR: 88 ML/MIN/1.73M2
EOSINOPHIL # BLD AUTO: 0.05 K/UL
EOSINOPHIL NFR BLD AUTO: 0.9 %
ESTIMATED AVERAGE GLUCOSE: 100 MG/DL
GLUCOSE QUALITATIVE U: NEGATIVE
GLUCOSE SERPL-MCNC: 91 MG/DL
HBA1C MFR BLD HPLC: 5.1 %
HCT VFR BLD CALC: 53.7 %
HDLC SERPL-MCNC: 65 MG/DL
HGB BLD-MCNC: 18 G/DL
HYALINE CASTS: 0 /LPF
IMM GRANULOCYTES NFR BLD AUTO: 0.2 %
KETONES URINE: NEGATIVE
LDLC SERPL CALC-MCNC: 136 MG/DL
LEUKOCYTE ESTERASE URINE: ABNORMAL
LYMPHOCYTES # BLD AUTO: 1.24 K/UL
LYMPHOCYTES NFR BLD AUTO: 23.2 %
MAN DIFF?: NORMAL
MCHC RBC-ENTMCNC: 32.9 PG
MCHC RBC-ENTMCNC: 33.5 GM/DL
MCV RBC AUTO: 98.2 FL
MICROSCOPIC-UA: NORMAL
MONOCYTES # BLD AUTO: 0.52 K/UL
MONOCYTES NFR BLD AUTO: 9.7 %
NEUTROPHILS # BLD AUTO: 3.48 K/UL
NEUTROPHILS NFR BLD AUTO: 65.1 %
NITRITE URINE: NEGATIVE
NONHDLC SERPL-MCNC: 151 MG/DL
PH URINE: 7
PLATELET # BLD AUTO: 229 K/UL
POTASSIUM SERPL-SCNC: 5 MMOL/L
PROT SERPL-MCNC: 7.7 G/DL
PROTEIN URINE: NEGATIVE
RBC # BLD: 5.47 M/UL
RBC # FLD: 12.1 %
RED BLOOD CELLS URINE: 0 /HPF
SODIUM SERPL-SCNC: 139 MMOL/L
SPECIFIC GRAVITY URINE: 1
SQUAMOUS EPITHELIAL CELLS: 1 /HPF
T4 FREE SERPL-MCNC: 1.6 NG/DL
TRIGL SERPL-MCNC: 73 MG/DL
TSH SERPL-ACNC: 1.18 UIU/ML
UROBILINOGEN URINE: NORMAL
WBC # FLD AUTO: 5.35 K/UL
WHITE BLOOD CELLS URINE: 12 /HPF

## 2023-06-26 ENCOUNTER — APPOINTMENT (OUTPATIENT)
Dept: INTERNAL MEDICINE | Facility: CLINIC | Age: 33
End: 2023-06-26
Payer: COMMERCIAL

## 2023-06-26 VITALS — DIASTOLIC BLOOD PRESSURE: 60 MMHG | SYSTOLIC BLOOD PRESSURE: 100 MMHG

## 2023-06-26 VITALS
OXYGEN SATURATION: 98 % | HEART RATE: 74 BPM | WEIGHT: 175 LBS | HEIGHT: 72 IN | TEMPERATURE: 97.5 F | BODY MASS INDEX: 23.7 KG/M2

## 2023-06-26 PROCEDURE — 99213 OFFICE O/P EST LOW 20 MIN: CPT | Mod: 25

## 2023-06-26 NOTE — PHYSICAL EXAM
[Normal] : no carotid or abdominal bruits heard, no varicosities, pedal pulses are present, no peripheral edema, no extremity clubbing or cyanosis and no palpable aorta [Soft] : abdomen soft [No Masses] : no abdominal mass palpated [Normal Bowel Sounds] : normal bowel sounds [de-identified] : mild right periumbilical tenderness

## 2023-06-26 NOTE — ASSESSMENT
[FreeTextEntry1] : He has a right lower abdomen discomfort with lifting weights.  He could have a hernia or a muscle strain.  No definite hernia felt on exam.  No fever or gastrointestinal symptoms to suggest a GI explanation.  He was advised to see a surgeon for further evaluation and he agrees.  Call PRN.

## 2023-06-26 NOTE — HISTORY OF PRESENT ILLNESS
[FreeTextEntry8] : The patient has a right lower abdominal pain for two weeks.  It is a pulling feeling, can be tender to touch. It runs from close to the umbilicus to the right inguinal area. He feels it when he is lifting weights and he notices that if he lifts too much weight, he has to stop. It can be ok with less weight. He notices it when he lifts in certain positions.  No N/V, fever, diarrhea, constipation, BRBPR. No relation to eating and his appetite is good. He hasn't had this before.  He hasn't taken any medication for it.

## 2023-06-27 ENCOUNTER — APPOINTMENT (OUTPATIENT)
Dept: SURGERY | Facility: CLINIC | Age: 33
End: 2023-06-27
Payer: COMMERCIAL

## 2023-06-27 VITALS
TEMPERATURE: 97.3 F | RESPIRATION RATE: 18 BRPM | BODY MASS INDEX: 23.03 KG/M2 | SYSTOLIC BLOOD PRESSURE: 128 MMHG | OXYGEN SATURATION: 98 % | WEIGHT: 170 LBS | HEIGHT: 72 IN | HEART RATE: 76 BPM | DIASTOLIC BLOOD PRESSURE: 79 MMHG

## 2023-06-27 DIAGNOSIS — R10.9 UNSPECIFIED ABDOMINAL PAIN: ICD-10-CM

## 2023-06-27 PROCEDURE — 99203 OFFICE O/P NEW LOW 30 MIN: CPT

## 2023-06-27 NOTE — CONSULT LETTER
[Dear  ___] : Dear  [unfilled], [Consult Letter:] : I had the pleasure of evaluating your patient, [unfilled]. [Please see my note below.] : Please see my note below. [Consult Closing:] : Thank you very much for allowing me to participate in the care of this patient.  If you have any questions, please do not hesitate to contact me. [Sincerely,] : Sincerely, [FreeTextEntry3] : I have reviewed all the documentation for this encounter with the patient and have edited where appropriate\par \par Dr. Shashank Alanis

## 2023-06-27 NOTE — ASSESSMENT
[FreeTextEntry1] : I cannot document that the patient has any specific hernia but due to the abnormality of the rectus on the right side and the question of possible rectus muscle hematoma etc. I have sent the patient for an abdominal and pelvic CT scan with contrast.

## 2023-06-27 NOTE — PHYSICAL EXAM
[Normal Breath Sounds] : Normal breath sounds [Normal Heart Sounds] : normal heart sounds [No Rash or Lesion] : No rash or lesion [Alert] : alert [Oriented to Person] : oriented to person [Oriented to Place] : oriented to place [Oriented to Time] : oriented to time [Calm] : calm [Abdominal Masses] : No abdominal masses [Abdomen Tenderness] : ~T ~M No abdominal tenderness [de-identified] : WNL  [de-identified] : WNL [de-identified] : MARLONL [de-identified] : Lamination of the abdomen fails to reveal any obvious hernia.  There is some noticeable swelling on the right side of the rectus muscle in the periumbilical area. [de-identified] : WNL ROM [de-identified] : WNL

## 2023-06-27 NOTE — HISTORY OF PRESENT ILLNESS
[de-identified] : Amarjit is a 31 y/o male here for a consultation visit, possible hernia. Patient felt discomfort, tight pulling sensations during lifting weights on and off for a couple of weeks.  It does not make any gurgling sound.   Regular once BM daily.  No voiding problems.  No abdominal surgery history.  Not on any ACG. No family history of hernias. \par

## 2023-06-28 ENCOUNTER — RESULT REVIEW (OUTPATIENT)
Age: 33
End: 2023-06-28

## 2023-07-01 ENCOUNTER — OUTPATIENT (OUTPATIENT)
Dept: OUTPATIENT SERVICES | Facility: HOSPITAL | Age: 33
LOS: 1 days | End: 2023-07-01
Payer: COMMERCIAL

## 2023-07-01 ENCOUNTER — APPOINTMENT (OUTPATIENT)
Dept: CT IMAGING | Facility: CLINIC | Age: 33
End: 2023-07-01
Payer: COMMERCIAL

## 2023-07-01 DIAGNOSIS — Z00.8 ENCOUNTER FOR OTHER GENERAL EXAMINATION: ICD-10-CM

## 2023-07-01 DIAGNOSIS — R10.9 UNSPECIFIED ABDOMINAL PAIN: ICD-10-CM

## 2023-07-01 PROCEDURE — 74177 CT ABD & PELVIS W/CONTRAST: CPT | Mod: 26

## 2023-07-01 PROCEDURE — 74177 CT ABD & PELVIS W/CONTRAST: CPT

## 2023-07-06 ENCOUNTER — NON-APPOINTMENT (OUTPATIENT)
Age: 33
End: 2023-07-06

## 2024-08-16 ENCOUNTER — APPOINTMENT (OUTPATIENT)
Dept: INTERNAL MEDICINE | Facility: CLINIC | Age: 34
End: 2024-08-16
Payer: COMMERCIAL

## 2024-08-16 VITALS — DIASTOLIC BLOOD PRESSURE: 68 MMHG | SYSTOLIC BLOOD PRESSURE: 108 MMHG

## 2024-08-16 VITALS
WEIGHT: 175 LBS | BODY MASS INDEX: 23.7 KG/M2 | HEART RATE: 82 BPM | HEIGHT: 72 IN | OXYGEN SATURATION: 98 % | TEMPERATURE: 97.6 F

## 2024-08-16 DIAGNOSIS — Z00.00 ENCOUNTER FOR GENERAL ADULT MEDICAL EXAMINATION W/OUT ABNORMAL FINDINGS: ICD-10-CM

## 2024-08-16 PROCEDURE — 99395 PREV VISIT EST AGE 18-39: CPT

## 2024-08-16 PROCEDURE — 36415 COLL VENOUS BLD VENIPUNCTURE: CPT

## 2024-08-16 NOTE — PHYSICAL EXAM
[No Acute Distress] : no acute distress [Well Nourished] : well nourished [Well Developed] : well developed [Well-Appearing] : well-appearing [Normal Sclera/Conjunctiva] : normal sclera/conjunctiva [PERRL] : pupils equal round and reactive to light [EOMI] : extraocular movements intact [Normal Outer Ear/Nose] : the outer ears and nose were normal in appearance [Normal Oropharynx] : the oropharynx was normal [No JVD] : no jugular venous distention [No Lymphadenopathy] : no lymphadenopathy [Supple] : supple [Thyroid Normal, No Nodules] : the thyroid was normal and there were no nodules present [No Respiratory Distress] : no respiratory distress  [No Accessory Muscle Use] : no accessory muscle use [Clear to Auscultation] : lungs were clear to auscultation bilaterally [Normal Rate] : normal rate  [Regular Rhythm] : with a regular rhythm [Normal S1, S2] : normal S1 and S2 [No Murmur] : no murmur heard [No Carotid Bruits] : no carotid bruits [No Abdominal Bruit] : a ~M bruit was not heard ~T in the abdomen [No Varicosities] : no varicosities [Pedal Pulses Present] : the pedal pulses are present [No Edema] : there was no peripheral edema [No Palpable Aorta] : no palpable aorta [No Extremity Clubbing/Cyanosis] : no extremity clubbing/cyanosis [Soft] : abdomen soft [Non Tender] : non-tender [Non-distended] : non-distended [No Masses] : no abdominal mass palpated [No HSM] : no HSM [Normal Bowel Sounds] : normal bowel sounds [Normal Posterior Cervical Nodes] : no posterior cervical lymphadenopathy [Normal Anterior Cervical Nodes] : no anterior cervical lymphadenopathy [No CVA Tenderness] : no CVA  tenderness [No Spinal Tenderness] : no spinal tenderness [No Joint Swelling] : no joint swelling [Grossly Normal Strength/Tone] : grossly normal strength/tone [No Rash] : no rash [Coordination Grossly Intact] : coordination grossly intact [No Focal Deficits] : no focal deficits [Normal Gait] : normal gait [Deep Tendon Reflexes (DTR)] : deep tendon reflexes were 2+ and symmetric [Normal Affect] : the affect was normal [Normal Insight/Judgement] : insight and judgment were intact [de-identified] : normal male, no masses

## 2024-08-16 NOTE — HISTORY OF PRESENT ILLNESS
[FreeTextEntry1] : The patient is here for a routine visit.  [de-identified] : He feels well.  He exercises regularly and his diet is healthy.    No GI or  symptoms.    He has been  for three years.

## 2024-08-16 NOTE — ASSESSMENT
[FreeTextEntry1] : Good health.  Discussed diet and exercise.  New COVID-19 vaccine and flu vaccine discussed.  He is going to see a dermatologist.

## 2024-08-16 NOTE — HEALTH RISK ASSESSMENT
[No falls in past year] : Patient reported no falls in the past year [0] : 2) Feeling down, depressed, or hopeless: Not at all (0) [Fully functional (bathing, dressing, toileting, transferring, walking, feeding)] : Fully functional (bathing, dressing, toileting, transferring, walking, feeding) [Fully functional (using the telephone, shopping, preparing meals, housekeeping, doing laundry, using] : Fully functional and needs no help or supervision to perform IADLs (using the telephone, shopping, preparing meals, housekeeping, doing laundry, using transportation, managing medications and managing finances) [[id number]] : 0 [Reports changes in hearing] : Reports no changes in hearing [Reports changes in vision] : Reports no changes in vision [Reports changes in dental health] : Reports no changes in dental health

## 2024-08-17 LAB
ALBUMIN SERPL ELPH-MCNC: 5.1 G/DL
ALP BLD-CCNC: 69 U/L
ALT SERPL-CCNC: 17 U/L
ANION GAP SERPL CALC-SCNC: 13 MMOL/L
APPEARANCE: CLEAR
AST SERPL-CCNC: 23 U/L
BACTERIA: NEGATIVE /HPF
BILIRUB SERPL-MCNC: 1.6 MG/DL
BILIRUBIN URINE: NEGATIVE
BLOOD URINE: NEGATIVE
BUN SERPL-MCNC: 22 MG/DL
CALCIUM SERPL-MCNC: 9.6 MG/DL
CAST: 0 /LPF
CHLORIDE SERPL-SCNC: 103 MMOL/L
CHOLEST SERPL-MCNC: 229 MG/DL
CO2 SERPL-SCNC: 25 MMOL/L
COLOR: YELLOW
CREAT SERPL-MCNC: 1.07 MG/DL
EGFR: 94 ML/MIN/1.73M2
EPITHELIAL CELLS: 0 /HPF
ESTIMATED AVERAGE GLUCOSE: 97 MG/DL
GLUCOSE QUALITATIVE U: NEGATIVE MG/DL
GLUCOSE SERPL-MCNC: 86 MG/DL
HBA1C MFR BLD HPLC: 5 %
HCT VFR BLD CALC: 48 %
HDLC SERPL-MCNC: 56 MG/DL
HGB BLD-MCNC: 16.1 G/DL
KETONES URINE: NEGATIVE MG/DL
LDLC SERPL CALC-MCNC: 151 MG/DL
LEUKOCYTE ESTERASE URINE: NEGATIVE
MCHC RBC-ENTMCNC: 32.1 PG
MCHC RBC-ENTMCNC: 33.5 GM/DL
MCV RBC AUTO: 95.6 FL
MICROSCOPIC-UA: NORMAL
NITRITE URINE: NEGATIVE
NONHDLC SERPL-MCNC: 173 MG/DL
PH URINE: 6
PLATELET # BLD AUTO: 188 K/UL
POTASSIUM SERPL-SCNC: 4.5 MMOL/L
PROT SERPL-MCNC: 7.2 G/DL
PROTEIN URINE: NEGATIVE MG/DL
RBC # BLD: 5.02 M/UL
RBC # FLD: 12.3 %
RED BLOOD CELLS URINE: 0 /HPF
SODIUM SERPL-SCNC: 140 MMOL/L
SPECIFIC GRAVITY URINE: 1.01
T4 FREE SERPL-MCNC: 1.5 NG/DL
TRIGL SERPL-MCNC: 123 MG/DL
TSH SERPL-ACNC: 1.69 UIU/ML
UROBILINOGEN URINE: 0.2 MG/DL
WBC # FLD AUTO: 8.23 K/UL
WHITE BLOOD CELLS URINE: 0 /HPF